# Patient Record
Sex: FEMALE | URBAN - METROPOLITAN AREA
[De-identification: names, ages, dates, MRNs, and addresses within clinical notes are randomized per-mention and may not be internally consistent; named-entity substitution may affect disease eponyms.]

---

## 2022-02-21 ENCOUNTER — OFFICE VISIT (OUTPATIENT)
Dept: URBAN - METROPOLITAN AREA CLINIC 21 | Facility: CLINIC | Age: 82
End: 2022-02-21
Payer: MEDICARE

## 2022-02-21 DIAGNOSIS — H25.813 COMBINED FORMS OF AGE-RELATED CATARACT, BILATERAL: Primary | ICD-10-CM

## 2022-02-21 PROCEDURE — 92025 CPTRIZED CORNEAL TOPOGRAPHY: CPT | Performed by: OPHTHALMOLOGY

## 2022-02-21 PROCEDURE — 99204 OFFICE O/P NEW MOD 45 MIN: CPT | Performed by: OPHTHALMOLOGY

## 2022-02-21 PROCEDURE — 92136 OPHTHALMIC BIOMETRY: CPT | Performed by: OPHTHALMOLOGY

## 2022-02-21 PROCEDURE — 92134 CPTRZ OPH DX IMG PST SGM RTA: CPT | Performed by: OPHTHALMOLOGY

## 2022-02-21 RX ORDER — TOBRAMYCIN 3 MG/ML
0.3 % SOLUTION/ DROPS OPHTHALMIC
Qty: 5 | Refills: 1 | Status: ACTIVE
Start: 2022-02-21

## 2022-02-21 RX ORDER — PREDNISOLONE ACETATE 10 MG/ML
1 % SUSPENSION/ DROPS OPHTHALMIC
Qty: 10 | Refills: 1 | Status: ACTIVE
Start: 2022-02-21

## 2022-02-21 ASSESSMENT — INTRAOCULAR PRESSURE
OS: 12
OD: 12

## 2022-02-21 NOTE — IMPRESSION/PLAN
Impression: Combined forms of age-related cataract, bilateral: H25.813. Plan: Discussed cataract surgery diagnosis with patient. Discussed risk and benefits Including infection, retinal detachment, droopy eye lid, swelling, bleeding, loss of vision and double vision. Patient elects to proceed, OD first, OS if patient elects. Target -2.25 Discussed Relacs and ORA- Pt elects Std sx. Pt elects Std IOL Erx PF 1% and Tobramycin, instructions given and reviewed

## 2022-04-04 ENCOUNTER — SURGERY (OUTPATIENT)
Dept: URBAN - METROPOLITAN AREA SURGERY 15 | Facility: SURGERY | Age: 82
End: 2022-04-04
Payer: MEDICARE

## 2022-04-04 PROCEDURE — 66982 XCAPSL CTRC RMVL CPLX WO ECP: CPT | Performed by: OPHTHALMOLOGY

## 2022-04-05 ENCOUNTER — POST-OPERATIVE VISIT (OUTPATIENT)
Dept: URBAN - METROPOLITAN AREA CLINIC 21 | Facility: CLINIC | Age: 82
End: 2022-04-05
Payer: MEDICARE

## 2022-04-05 DIAGNOSIS — Z48.810 ENCOUNTER FOR SURGICAL AFTERCARE FOLLOWING SURGERY ON A SENSE ORGAN: Primary | ICD-10-CM

## 2022-04-05 PROCEDURE — 99024 POSTOP FOLLOW-UP VISIT: CPT | Performed by: OPTOMETRIST

## 2022-04-05 ASSESSMENT — INTRAOCULAR PRESSURE: OD: 16

## 2022-04-05 NOTE — IMPRESSION/PLAN
Impression: S/P Cataract Extraction by phacoemulsification with IOL placement; Complex with pupil stretch OD - 1 Day. Encounter for surgical aftercare following surgery on a sense organ  Z48.810. Excellent post op course   Post operative instructions reviewed - Condition is improving - Plan: No infection or complications. Patient doing well, will continue drops as scheduled. Patient will return in 1 week for post-op exam.  If any changes return sooner.

## 2022-04-11 ENCOUNTER — POST-OPERATIVE VISIT (OUTPATIENT)
Dept: URBAN - METROPOLITAN AREA CLINIC 21 | Facility: CLINIC | Age: 82
End: 2022-04-11
Payer: MEDICARE

## 2022-04-11 DIAGNOSIS — Z48.810 ENCOUNTER FOR SURGICAL AFTERCARE FOLLOWING SURGERY ON A SENSE ORGAN: Primary | ICD-10-CM

## 2022-04-11 DIAGNOSIS — H52.4 PRESBYOPIA: ICD-10-CM

## 2022-04-11 PROCEDURE — 99024 POSTOP FOLLOW-UP VISIT: CPT | Performed by: OPHTHALMOLOGY

## 2022-04-11 PROCEDURE — 92015 DETERMINE REFRACTIVE STATE: CPT | Performed by: OPHTHALMOLOGY

## 2022-04-11 ASSESSMENT — INTRAOCULAR PRESSURE: OD: 16

## 2022-04-11 ASSESSMENT — VISUAL ACUITY: OD: 20/25

## 2022-04-11 NOTE — IMPRESSION/PLAN
Impression: S/P Cataract Extraction by phacoemulsification with IOL placement; Complex with pupil stretch OD - 7 Days. Encounter for surgical aftercare following surgery on a sense organ  Z48.810. Plan: Pt to continue drops as directed.  RTC in 3 weeks with Dr Dominique Watson

## 2022-05-04 ENCOUNTER — POST-OPERATIVE VISIT (OUTPATIENT)
Dept: URBAN - METROPOLITAN AREA CLINIC 21 | Facility: CLINIC | Age: 82
End: 2022-05-04
Payer: MEDICARE

## 2022-05-04 DIAGNOSIS — H52.4 PRESBYOPIA: Primary | ICD-10-CM

## 2022-05-04 DIAGNOSIS — H52.223 REGULAR ASTIGMATISM, BILATERAL: ICD-10-CM

## 2022-05-04 DIAGNOSIS — Z48.810 ENCOUNTER FOR SURGICAL AFTERCARE FOLLOWING SURGERY ON A SENSE ORGAN: ICD-10-CM

## 2022-05-04 PROCEDURE — 99024 POSTOP FOLLOW-UP VISIT: CPT | Performed by: OPTOMETRIST

## 2022-05-04 ASSESSMENT — VISUAL ACUITY
OD: 20/20
OS: 20/25

## 2022-05-04 NOTE — IMPRESSION/PLAN
Impression: S/P Cataract Extraction by phacoemulsification with IOL placement; Complex with pupil stretch OD - 30 Days. Encounter for surgical aftercare following surgery on a sense organ  Z48.810. Excellent post op course   Post operative instructions reviewed - Plan: RTC when OS symptomatic for cat.  sx